# Patient Record
Sex: MALE | Employment: FULL TIME | URBAN - METROPOLITAN AREA
[De-identification: names, ages, dates, MRNs, and addresses within clinical notes are randomized per-mention and may not be internally consistent; named-entity substitution may affect disease eponyms.]

---

## 2021-05-27 ENCOUNTER — OFFICE VISIT (OUTPATIENT)
Dept: INTERNAL MEDICINE CLINIC | Facility: CLINIC | Age: 56
End: 2021-05-27
Payer: COMMERCIAL

## 2021-05-27 VITALS
WEIGHT: 179 LBS | BODY MASS INDEX: 28.77 KG/M2 | SYSTOLIC BLOOD PRESSURE: 122 MMHG | DIASTOLIC BLOOD PRESSURE: 84 MMHG | HEART RATE: 70 BPM | HEIGHT: 66 IN | RESPIRATION RATE: 16 BRPM | TEMPERATURE: 97 F

## 2021-05-27 DIAGNOSIS — Z13.220 SCREENING FOR LIPID DISORDERS: ICD-10-CM

## 2021-05-27 DIAGNOSIS — Z13.228 SCREENING FOR ENDOCRINE, NUTRITIONAL, METABOLIC AND IMMUNITY DISORDER: ICD-10-CM

## 2021-05-27 DIAGNOSIS — Z13.21 SCREENING FOR ENDOCRINE, NUTRITIONAL, METABOLIC AND IMMUNITY DISORDER: ICD-10-CM

## 2021-05-27 DIAGNOSIS — Z12.11 SCREEN FOR COLON CANCER: ICD-10-CM

## 2021-05-27 DIAGNOSIS — Z13.29 SCREENING FOR THYROID DISORDER: ICD-10-CM

## 2021-05-27 DIAGNOSIS — Z13.0 SCREENING FOR DISORDER OF BLOOD AND BLOOD-FORMING ORGANS: ICD-10-CM

## 2021-05-27 DIAGNOSIS — Z13.29 SCREENING FOR ENDOCRINE, NUTRITIONAL, METABOLIC AND IMMUNITY DISORDER: ICD-10-CM

## 2021-05-27 DIAGNOSIS — Z00.00 ROUTINE GENERAL MEDICAL EXAMINATION AT A HEALTH CARE FACILITY: Primary | ICD-10-CM

## 2021-05-27 DIAGNOSIS — Z13.0 SCREENING FOR ENDOCRINE, NUTRITIONAL, METABOLIC AND IMMUNITY DISORDER: ICD-10-CM

## 2021-05-27 DIAGNOSIS — Z12.5 ENCOUNTER FOR SPECIAL SCREENING EXAMINATION FOR NEOPLASM OF PROSTATE: ICD-10-CM

## 2021-05-27 PROCEDURE — 3074F SYST BP LT 130 MM HG: CPT | Performed by: INTERNAL MEDICINE

## 2021-05-27 PROCEDURE — 99386 PREV VISIT NEW AGE 40-64: CPT | Performed by: INTERNAL MEDICINE

## 2021-05-27 PROCEDURE — 3079F DIAST BP 80-89 MM HG: CPT | Performed by: INTERNAL MEDICINE

## 2021-05-27 PROCEDURE — 3008F BODY MASS INDEX DOCD: CPT | Performed by: INTERNAL MEDICINE

## 2021-05-28 NOTE — PROGRESS NOTES
Patient presents with:  Physical: DD Rm 3, NP Establish Care      HPI:  [place] speaking patient here for CPE.  He is , I see his wife  He work in a factory  Diet- fair  Exercise- none  H/o mild HL- improved with diet  No acute complaints  Due for cs Pfizer 30 mcg/0.3 ml                          05/21/2021       Physical Exam  /84 (BP Location: Right arm, Patient Position: Sitting, Cuff Size: large)   Pulse 70   Temp 97.3 °F (36.3 °C) (Temporal)   Resp 16   Ht 5' 6\" (1.676 m)   Wt 179 lb (81.2 k TSH W REFLEX TO FREE T4 [95555][Q]      Meds & Refills for this Visit:  Requested Prescriptions      No prescriptions requested or ordered in this encounter       Imaging & Consults:  GASTRO - INTERNAL      No follow-ups on file.     There are no Patient

## 2022-01-14 ENCOUNTER — TELEMEDICINE (OUTPATIENT)
Dept: INTERNAL MEDICINE CLINIC | Facility: CLINIC | Age: 57
End: 2022-01-14

## 2022-01-14 DIAGNOSIS — U07.1 COVID-19 VIRUS INFECTION: Primary | ICD-10-CM

## 2022-01-14 DIAGNOSIS — R05.9 COUGH: ICD-10-CM

## 2022-01-14 PROCEDURE — 99213 OFFICE O/P EST LOW 20 MIN: CPT | Performed by: FAMILY MEDICINE

## 2022-01-14 RX ORDER — BENZONATATE 100 MG/1
100 CAPSULE ORAL 3 TIMES DAILY PRN
Qty: 21 CAPSULE | Refills: 0 | Status: SHIPPED | OUTPATIENT
Start: 2022-01-14 | End: 2022-02-01

## 2022-01-14 NOTE — PROGRESS NOTES
Due to COVID-19 ACTION PLAN, the patient's office visit was converted to a video visit with informed patient consent.    Time Spent: 12 min    Subjective     HPI:   Angela Wade is a 62year old male who presents for evaluation of fever, myalgias, and feve decision-making and tests are ordered as detailed in the plan of care above.

## 2022-01-17 ENCOUNTER — TELEPHONE (OUTPATIENT)
Dept: INTERNAL MEDICINE CLINIC | Facility: CLINIC | Age: 57
End: 2022-01-17

## 2022-01-17 NOTE — TELEPHONE ENCOUNTER
Pt spouse calling on pt's behalf (on hippa). Pt is still having a temp that is 100 degrees or higher. Is now also experiencing pain in both ears, are we able to send something in for the ear pain? Please advise?

## 2022-01-17 NOTE — TELEPHONE ENCOUNTER
Telemed 1/14 with [facility]    Wife reports patient developed bilat ear pain and fever both on Saturday night. Temp yesterday 101.0, today 100.0. Taking tylenol and motrin. Denies dizziness or ear discharge. Wife asking for treatment. Please advise.

## 2022-01-17 NOTE — TELEPHONE ENCOUNTER
Discussed with patient's wife. Pt has waxing and waning ear fullness. Had fever over the weekend, but starting to improve. Discussed continued monitoring and supportive care with tylenol and motrin.  Symptoms likely secondary to his COVID infection that beg

## 2022-02-01 RX ORDER — BENZONATATE 100 MG/1
CAPSULE ORAL
Qty: 21 CAPSULE | Refills: 0 | Status: SHIPPED | OUTPATIENT
Start: 2022-02-01

## 2022-02-01 NOTE — TELEPHONE ENCOUNTER
Last OV 21  Last PE 21  Last REFILL   Medication Quantity Refills Start End   benzonatate 100 MG Oral Cap () 21 capsule 0 2022     Last LABS 21 tsh, psa, lipid, cmp, cbc    No future appointments. Per PROTOCOL?   Not on protocol     Please Advise

## 2023-07-17 ENCOUNTER — TELEPHONE (OUTPATIENT)
Dept: INTERNAL MEDICINE CLINIC | Facility: CLINIC | Age: 58
End: 2023-07-17

## 2023-07-17 DIAGNOSIS — Z13.29 SCREENING FOR THYROID DISORDER: ICD-10-CM

## 2023-07-17 DIAGNOSIS — Z13.220 SCREENING FOR LIPID DISORDERS: ICD-10-CM

## 2023-07-17 DIAGNOSIS — Z00.00 ROUTINE GENERAL MEDICAL EXAMINATION AT A HEALTH CARE FACILITY: Primary | ICD-10-CM

## 2023-07-17 DIAGNOSIS — Z12.5 SCREENING FOR MALIGNANT NEOPLASM OF PROSTATE: ICD-10-CM

## 2023-07-17 DIAGNOSIS — Z13.228 SCREENING FOR METABOLIC DISORDER: ICD-10-CM

## 2023-07-17 DIAGNOSIS — Z13.0 SCREENING FOR DISORDER OF BLOOD AND BLOOD-FORMING ORGANS: ICD-10-CM

## 2023-07-17 NOTE — TELEPHONE ENCOUNTER
Informed must fast no call back required. Orders to [facility].     Future Appointments   Date Time Provider Latoya Juana   7/27/2023  5:00 PM Mile Duvall MD [facility]

## 2023-07-23 LAB
ABSOLUTE BASOPHILS: 20 CELLS/UL (ref 0–200)
ABSOLUTE EOSINOPHILS: 52 CELLS/UL (ref 15–500)
ABSOLUTE LYMPHOCYTES: 3062 CELLS/UL (ref 850–3900)
ABSOLUTE MONOCYTES: 624 CELLS/UL (ref 200–950)
ABSOLUTE NEUTROPHILS: 2743 CELLS/UL (ref 1500–7800)
ALBUMIN/GLOBULIN RATIO: 1.3 (CALC) (ref 1–2.5)
ALBUMIN: 4.2 G/DL (ref 3.6–5.1)
ALKALINE PHOSPHATASE: 84 U/L (ref 35–144)
ALT: 18 U/L (ref 9–46)
AST: 17 U/L (ref 10–35)
BASOPHILS: 0.3 %
BILIRUBIN, TOTAL: 0.7 MG/DL (ref 0.2–1.2)
BUN: 17 MG/DL (ref 7–25)
CALCIUM: 9.6 MG/DL (ref 8.6–10.3)
CARBON DIOXIDE: 28 MMOL/L (ref 20–32)
CHLORIDE: 103 MMOL/L (ref 98–110)
CHOL/HDLC RATIO: 3.4 (CALC)
CHOLESTEROL, TOTAL: 240 MG/DL
CREATININE: 0.72 MG/DL (ref 0.7–1.3)
EGFR: 106 ML/MIN/1.73M2
EOSINOPHILS: 0.8 %
GLOBULIN: 3.2 G/DL (CALC) (ref 1.9–3.7)
GLUCOSE: 98 MG/DL (ref 65–99)
HDL CHOLESTEROL: 70 MG/DL
HEMATOCRIT: 45.7 % (ref 38.5–50)
HEMOGLOBIN: 15.1 G/DL (ref 13.2–17.1)
LDL-CHOLESTEROL: 145 MG/DL (CALC)
LYMPHOCYTES: 47.1 %
MCH: 32.5 PG (ref 27–33)
MCHC: 33 G/DL (ref 32–36)
MCV: 98.5 FL (ref 80–100)
MONOCYTES: 9.6 %
MPV: 10.2 FL (ref 7.5–12.5)
NEUTROPHILS: 42.2 %
NON-HDL CHOLESTEROL: 170 MG/DL (CALC)
PLATELET COUNT: 225 THOUSAND/UL (ref 140–400)
POTASSIUM: 4.3 MMOL/L (ref 3.5–5.3)
PROTEIN, TOTAL: 7.4 G/DL (ref 6.1–8.1)
PSA, TOTAL: 0.78 NG/ML
RDW: 12.5 % (ref 11–15)
RED BLOOD CELL COUNT: 4.64 MILLION/UL (ref 4.2–5.8)
SODIUM: 139 MMOL/L (ref 135–146)
TRIGLYCERIDES: 128 MG/DL
TSH W/REFLEX TO FT4: 3.91 MIU/L (ref 0.4–4.5)
WHITE BLOOD CELL COUNT: 6.5 THOUSAND/UL (ref 3.8–10.8)

## 2023-07-27 ENCOUNTER — OFFICE VISIT (OUTPATIENT)
Dept: INTERNAL MEDICINE CLINIC | Facility: CLINIC | Age: 58
End: 2023-07-27
Payer: COMMERCIAL

## 2023-07-27 VITALS
SYSTOLIC BLOOD PRESSURE: 114 MMHG | HEIGHT: 64.17 IN | BODY MASS INDEX: 31.1 KG/M2 | OXYGEN SATURATION: 96 % | TEMPERATURE: 98 F | DIASTOLIC BLOOD PRESSURE: 72 MMHG | HEART RATE: 76 BPM | WEIGHT: 182.19 LBS | RESPIRATION RATE: 16 BRPM

## 2023-07-27 DIAGNOSIS — E78.00 HIGH CHOLESTEROL: ICD-10-CM

## 2023-07-27 DIAGNOSIS — Z12.11 SCREEN FOR COLON CANCER: ICD-10-CM

## 2023-07-27 DIAGNOSIS — Z12.11 ENCOUNTER FOR SCREENING FECAL OCCULT BLOOD TESTING: ICD-10-CM

## 2023-07-27 DIAGNOSIS — Z00.00 ROUTINE GENERAL MEDICAL EXAMINATION AT A HEALTH CARE FACILITY: Primary | ICD-10-CM

## 2023-07-27 LAB
ATRIAL RATE: 73 BPM
P AXIS: 33 DEGREES
P-R INTERVAL: 130 MS
Q-T INTERVAL: 374 MS
QRS DURATION: 80 MS
QTC CALCULATION (BEZET): 412 MS
R AXIS: -25 DEGREES
T AXIS: 34 DEGREES
VENTRICULAR RATE: 73 BPM

## 2023-07-27 PROCEDURE — 99396 PREV VISIT EST AGE 40-64: CPT | Performed by: INTERNAL MEDICINE

## 2023-07-27 PROCEDURE — 93000 ELECTROCARDIOGRAM COMPLETE: CPT | Performed by: INTERNAL MEDICINE

## 2023-07-27 PROCEDURE — 3074F SYST BP LT 130 MM HG: CPT | Performed by: INTERNAL MEDICINE

## 2023-07-27 PROCEDURE — 3078F DIAST BP <80 MM HG: CPT | Performed by: INTERNAL MEDICINE

## 2023-07-27 PROCEDURE — 3008F BODY MASS INDEX DOCD: CPT | Performed by: INTERNAL MEDICINE

## 2024-07-19 ENCOUNTER — HOSPITAL ENCOUNTER (OUTPATIENT)
Dept: GENERAL RADIOLOGY | Age: 59
Discharge: HOME | End: 2024-07-19
Attending: PHYSICIAN ASSISTANT
Payer: COMMERCIAL

## 2024-07-19 ENCOUNTER — LAB ENCOUNTER (OUTPATIENT)
Dept: LAB | Age: 59
End: 2024-07-19
Attending: PHYSICIAN ASSISTANT
Payer: COMMERCIAL

## 2024-07-19 ENCOUNTER — OFFICE VISIT (OUTPATIENT)
Dept: INTERNAL MEDICINE CLINIC | Facility: CLINIC | Age: 59
End: 2024-07-19
Payer: COMMERCIAL

## 2024-07-19 VITALS
OXYGEN SATURATION: 96 % | SYSTOLIC BLOOD PRESSURE: 122 MMHG | BODY MASS INDEX: 31.01 KG/M2 | WEIGHT: 181.63 LBS | DIASTOLIC BLOOD PRESSURE: 80 MMHG | HEIGHT: 64.17 IN | RESPIRATION RATE: 18 BRPM | TEMPERATURE: 97 F | HEART RATE: 67 BPM

## 2024-07-19 DIAGNOSIS — M25.552 LEFT HIP PAIN: ICD-10-CM

## 2024-07-19 DIAGNOSIS — M72.2 PLANTAR FASCIITIS: ICD-10-CM

## 2024-07-19 DIAGNOSIS — E78.00 HIGH CHOLESTEROL: ICD-10-CM

## 2024-07-19 DIAGNOSIS — M25.552 LEFT HIP PAIN: Primary | ICD-10-CM

## 2024-07-19 DIAGNOSIS — R25.1 TREMOR: ICD-10-CM

## 2024-07-19 LAB
ALBUMIN SERPL-MCNC: 4.7 G/DL (ref 3.2–4.8)
ALBUMIN/GLOB SERPL: 1.5 {RATIO} (ref 1–2)
ALP LIVER SERPL-CCNC: 93 U/L
ALT SERPL-CCNC: 34 U/L
ANION GAP SERPL CALC-SCNC: 7 MMOL/L (ref 0–18)
AST SERPL-CCNC: 26 U/L (ref ?–34)
BASOPHILS # BLD AUTO: 0.03 X10(3) UL (ref 0–0.2)
BASOPHILS NFR BLD AUTO: 0.7 %
BILIRUB SERPL-MCNC: 0.8 MG/DL (ref 0.3–1.2)
BUN BLD-MCNC: 11 MG/DL (ref 9–23)
CALCIUM BLD-MCNC: 9.7 MG/DL (ref 8.7–10.4)
CHLORIDE SERPL-SCNC: 105 MMOL/L (ref 98–112)
CHOLEST SERPL-MCNC: 209 MG/DL (ref ?–200)
CO2 SERPL-SCNC: 27 MMOL/L (ref 21–32)
CREAT BLD-MCNC: 0.64 MG/DL
EGFRCR SERPLBLD CKD-EPI 2021: 109 ML/MIN/1.73M2 (ref 60–?)
EOSINOPHIL # BLD AUTO: 0.03 X10(3) UL (ref 0–0.7)
EOSINOPHIL NFR BLD AUTO: 0.7 %
ERYTHROCYTE [DISTWIDTH] IN BLOOD BY AUTOMATED COUNT: 12.6 %
FASTING PATIENT LIPID ANSWER: YES
FASTING STATUS PATIENT QL REPORTED: YES
GLOBULIN PLAS-MCNC: 3.2 G/DL (ref 2.8–4.4)
GLUCOSE BLD-MCNC: 97 MG/DL (ref 70–99)
HCT VFR BLD AUTO: 44 %
HDLC SERPL-MCNC: 63 MG/DL (ref 40–59)
HGB BLD-MCNC: 15.3 G/DL
IMM GRANULOCYTES # BLD AUTO: 0.01 X10(3) UL (ref 0–1)
IMM GRANULOCYTES NFR BLD: 0.2 %
LDLC SERPL CALC-MCNC: 131 MG/DL (ref ?–100)
LYMPHOCYTES # BLD AUTO: 1.85 X10(3) UL (ref 1–4)
LYMPHOCYTES NFR BLD AUTO: 41.7 %
MCH RBC QN AUTO: 33.1 PG (ref 26–34)
MCHC RBC AUTO-ENTMCNC: 34.8 G/DL (ref 31–37)
MCV RBC AUTO: 95.2 FL
MONOCYTES # BLD AUTO: 0.62 X10(3) UL (ref 0.1–1)
MONOCYTES NFR BLD AUTO: 14 %
NEUTROPHILS # BLD AUTO: 1.9 X10 (3) UL (ref 1.5–7.7)
NEUTROPHILS # BLD AUTO: 1.9 X10(3) UL (ref 1.5–7.7)
NEUTROPHILS NFR BLD AUTO: 42.7 %
NONHDLC SERPL-MCNC: 146 MG/DL (ref ?–130)
OSMOLALITY SERPL CALC.SUM OF ELEC: 287 MOSM/KG (ref 275–295)
PLATELET # BLD AUTO: 208 10(3)UL (ref 150–450)
POTASSIUM SERPL-SCNC: 4.4 MMOL/L (ref 3.5–5.1)
PROT SERPL-MCNC: 7.9 G/DL (ref 5.7–8.2)
RBC # BLD AUTO: 4.62 X10(6)UL
SODIUM SERPL-SCNC: 139 MMOL/L (ref 136–145)
TRIGL SERPL-MCNC: 85 MG/DL (ref 30–149)
TSI SER-ACNC: 2.69 MIU/ML (ref 0.55–4.78)
VLDLC SERPL CALC-MCNC: 15 MG/DL (ref 0–30)
WBC # BLD AUTO: 4.4 X10(3) UL (ref 4–11)

## 2024-07-19 PROCEDURE — 80061 LIPID PANEL: CPT | Performed by: PHYSICIAN ASSISTANT

## 2024-07-19 PROCEDURE — 73502 X-RAY EXAM HIP UNI 2-3 VIEWS: CPT | Performed by: PHYSICIAN ASSISTANT

## 2024-07-19 PROCEDURE — G2211 COMPLEX E/M VISIT ADD ON: HCPCS | Performed by: PHYSICIAN ASSISTANT

## 2024-07-19 PROCEDURE — 80053 COMPREHEN METABOLIC PANEL: CPT | Performed by: PHYSICIAN ASSISTANT

## 2024-07-19 PROCEDURE — 85025 COMPLETE CBC W/AUTO DIFF WBC: CPT | Performed by: PHYSICIAN ASSISTANT

## 2024-07-19 PROCEDURE — 36415 COLL VENOUS BLD VENIPUNCTURE: CPT | Performed by: PHYSICIAN ASSISTANT

## 2024-07-19 PROCEDURE — 99214 OFFICE O/P EST MOD 30 MIN: CPT | Performed by: PHYSICIAN ASSISTANT

## 2024-07-19 PROCEDURE — 84443 ASSAY THYROID STIM HORMONE: CPT | Performed by: PHYSICIAN ASSISTANT

## 2024-07-19 PROCEDURE — 82607 VITAMIN B-12: CPT | Performed by: PHYSICIAN ASSISTANT

## 2024-07-19 RX ORDER — MULTIVIT-MIN/IRON FUM/FOLIC AC 7.5 MG-4
1 TABLET ORAL DAILY
COMMUNITY

## 2024-07-19 NOTE — PROGRESS NOTES
Roddy Ca is a 59 year old male.   Chief Complaint   Patient presents with    Hand Pain     EJ Rm 13- Pt is here for left hand pain and trembling for over a month, the pain is increasing    Hip Pain     Here for left hip pain     HPI:    Pt presents with the following:      Tremor. Worst in left hand but also has in the right hand and left leg. Tremor has been ongoing x 2 years but wife thinks this is maybe worse now. Tremor is at rest and when reaching for items. Intermittent pain in left wrist. Denies numbness and tingling. Denies weakness. Denies family h/o tremor. He does not take any medications. He drinks 1 cup of coffee per day. No increase in caffeine to explain worsening tremor.     Also with left hip pain. Pt believes this first started 2 years ago as well but wife thinks this is more recent. He notes some left heel pain in the recent past and hip pain worsened after that. Heel is now better but still with hip pain. He feels the pain in his lateral hip with walking and with laying on left side. Denies back pain. Denies pain radiating into left arm. Denies numbness, tingling, and weakness in legs.     Allergies:  No Known Allergies   Current Meds:  Current Outpatient Medications   Medication Sig Dispense Refill    Multiple Vitamins-Minerals (MULTI-VITAMIN/MINERALS) Oral Tab Take 1 tablet by mouth daily.          PMH:   No past medical history on file.    ROS:   GENERAL: Negative for fever, chills and fatigue. NAD.  HENT: Negative for congestion, sore throat, and ear pain.  RESPIRATORY: Negative for cough, chest tightness, shortness of breath and wheezing.    CV: Negative for chest pain, palpitations and leg swelling.   GI: Negative for nausea, vomiting, abdominal pain, diarrhea, and blood in stool.   : Negative for dysuria, hematuria and difficulty urinating.   MUSCULOSKELETAL: +left hip pain, +left wrist pain   NEURO: Negative for dizziness, syncope, weakness, numbness, tingling and headaches.  +tremor.   PSYCH: The patient is not nervous/anxious. No depression.      PHYSICAL EXAM:    /80   Pulse 67   Temp 96.7 °F (35.9 °C) (Temporal)   Resp 18   Ht 5' 4.17\" (1.63 m)   Wt 181 lb 9.6 oz (82.4 kg)   SpO2 96%   BMI 31.01 kg/m²     GENERAL: NAD. A&Ox3  RESPIRATORY: CTAB, no R/R/W  CV: RRR, no murmurs.   ABD: Soft, non-tender, non-distended. +bowel sounds. No rebound tenderness.   NEURO: CN 2-12 grossly intact, strength 5/5 all ext, +resting tremor in both hands, tremors worsen with intention. No focal deficits.   UE: no tenderness on palpation, neg tinnels, neg phalens, strength 5/5 b/l  LE: left lateral hip tenderness, strength 5/5 b/l, full ROM b/l   PSYCH: Appropriate mood and affect.      ASSESSMENT/ PLAN:   1. Left hip pain  Check xray   Suspect bursitis/tendonitis related to compensation due to gait changes when heel was bothersome   Ibuprofen prn   May benefit from PT  - XR HIP W OR WO PELVIS 2 OR 3 VIEWS, LEFT (CPT=73502); Future    2. Tremor  Unclear etiology   Check labs   Will need neuro eval - referral placed today   - CBC W Differential W Platelet [E]  - TSH W Reflex To Free T4 [E]  - Comp Metabolic Panel (14) [E]  - Vitamin B12 [E]  - Neuro Referral - In Network    3. High cholesterol  Due for labs   - Lipid Panel [E]    4. Plantar fasciitis  Better now but stretching info given to help prevent/treat recurrence       Health Maintenance Due   Topic Date Due    Colorectal Cancer Screening  Never done    DTaP,Tdap,and Td Vaccines (1 - Tdap) Never done    Zoster Vaccines (1 of 2) Never done    COVID-19 Vaccine (4 - 2023-24 season) 09/01/2023    Annual Depression Screening  01/01/2024    Annual Physical  07/27/2024           Pt indicates understanding and agrees to the plan.     Return if symptoms worsen or fail to improve.    Kathryn Cifuentes PA-C

## 2024-07-20 LAB — VIT B12 SERPL-MCNC: 1730 PG/ML (ref 211–911)

## 2024-10-01 ENCOUNTER — OFFICE VISIT (OUTPATIENT)
Dept: NEUROLOGY | Facility: CLINIC | Age: 59
End: 2024-10-01
Payer: COMMERCIAL

## 2024-10-01 VITALS
HEIGHT: 66 IN | RESPIRATION RATE: 16 BRPM | BODY MASS INDEX: 29.41 KG/M2 | OXYGEN SATURATION: 98 % | HEART RATE: 80 BPM | DIASTOLIC BLOOD PRESSURE: 80 MMHG | WEIGHT: 183 LBS | SYSTOLIC BLOOD PRESSURE: 130 MMHG

## 2024-10-01 DIAGNOSIS — G20.C PARKINSONISM, UNSPECIFIED PARKINSONISM TYPE (HCC): Primary | ICD-10-CM

## 2024-10-01 PROCEDURE — 99204 OFFICE O/P NEW MOD 45 MIN: CPT | Performed by: OTHER

## 2024-10-01 RX ORDER — CARBIDOPA AND LEVODOPA 25; 100 MG/1; MG/1
TABLET ORAL
Qty: 30 TABLET | Refills: 2 | Status: SHIPPED | OUTPATIENT
Start: 2024-10-01

## 2024-10-01 NOTE — PROGRESS NOTES
Neurology History & Physical     ASSESSMENT & PLAN:      ICD-10-CM    1. Parkinsonism, unspecified Parkinsonism type (HCC)  G20.C         Parkinsonism, likely idiopathic PD, uncontrolled, start sinemet.  We discussed medication side effects.    Return in about 6 weeks (around 11/12/2024). With Dr. Landrum       ~~~~~~~~~~~~~~~~~~~~~~~~~~~    CHIEF COMPLAINT / REASON FOR VISIT:    Chief Complaint   Patient presents with    Tremors     Patient is here with his wife today  Patient stated his left hand/leg is having tremors      HISTORY OBTAINED FROM:  Patient and others as above  Chart review    HISTORY:  Roddy Ca is a 59 year old male with left hand tremor for about 1 year, and leg tremor starting about 3 months ago.  Has worsening imbalance in past year, but there is also heel pain.  No diarrhea or constipation.  No voice changes.  Wife doesn't believe his face has changed - he is a quiet, expressionless person to begin with.  Occ there is RUE tremor too.  He has always slept poorly, nothing new with that, no acting out of dreams or talking in sleep.  Doesn't take any medications, no recent dopamine agonist use.  Seems to have some trouble getting moving - lucina getting out of a chair, not necessarily during walking or turning.    DATA REVIEWED:  As documented in the history    July 2024  PA note  B12, CMP, CBC, TSH unremarkable    PHYSICAL EXAMINATION:  /80   Pulse 80   Resp 16   Ht 66\"   Wt 183 lb (83 kg)   SpO2 98%   BMI 29.54 kg/m²     Gen: in NAD  MSE: AAOx3, nl language, nl attn/conc, nl fund of knowledge  CN: PERRL, VFF; EOMI, no nystagmus; nl facial mvmt bilaterally; nl hearing bilaterally; nl palate elevation bilaterally; nl voice; nl shoulder shrug b/I; nl tongue movement  Motor: 5/5 x4; LUE resting tremor and cogwheeling, and slowed LUE finger tapping  Sensory: nl LT x4  Coord: nl FTN b/I  Reflex: 1+ BUE and BLE  Gait: normal turn, reduced Left arm swing with visible resting tremor in  COLLIN    No Known Allergies    Current medications:   carbidopa-levodopa  MG Oral Tab Half tablet with breakfast for 5 days, then half tablet with breakfast and with lunch 30 tablet 2    Multiple Vitamins-Minerals (MULTI-VITAMIN/MINERALS) Oral Tab Take 1 tablet by mouth daily.         History reviewed. No pertinent past medical history.    Past Surgical History:   Procedure Laterality Date    Hernia surgery  02/2019    left inguinal hernia- Dr. Nohemi Daniels    Other surgical history  2012    surgery on right ankle       Social History     Socioeconomic History    Marital status:    Tobacco Use    Smoking status: Former    Smokeless tobacco: Never    Tobacco comments:     social   Vaping Use    Vaping status: Never Used   Substance and Sexual Activity    Alcohol use: Yes     Comment: social CAGE 5/27/21    Drug use: No    Sexual activity: Yes   Other Topics Concern    Caffeine Concern Yes     Comment: Coffee    Exercise No       History reviewed. No pertinent family history.    Zachariah Carrington MD, FAES, FAAN  Board-Certified in Neurology, Epilepsy, and Clinical Neurophysiology  [facility]

## 2024-10-01 NOTE — PATIENT INSTRUCTIONS
Refill policies:    Allow 2-3 business days for refills; controlled substances may take longer.  Contact your pharmacy at least 5 days prior to running out of medication and have them send an electronic request or submit request through the “request refill” option in your [employer] account.  Refills are not addressed on weekends; covering physicians do not authorize routine medications on weekends.  No narcotics or controlled substances are refilled after noon on Fridays or by on call physicians.  By law, narcotics must be electronically prescribed.  A 30 day supply with no refills is the maximum allowed.  If your prescription is due for a refill, you may be due for a follow up appointment.  To best provide you care, patients receiving routine medications need to be seen at least once a year.  Patients receiving narcotic/controlled substance medications need to be seen at least once every 3 months.  In the event that your preferred pharmacy does not have the requested medication in stock (e.g. Backordered), it is your responsibility to find another pharmacy that has the requested medication available.  We will gladly send a new prescription to that pharmacy at your request.    Scheduling Tests:    If your physician has ordered radiology tests such as MRI or CT scans, please contact Central Scheduling at [phone] right away to schedule the test.  Once scheduled, the [facility] Centralized Referral Team will work with your insurance carrier to obtain pre-certification or prior authorization.  Depending on your insurance carrier, approval may take 3-10 days.  It is highly recommended patients assure they have received an authorization before having a test performed.  If test is done without insurance authorization, patient may be responsible for the entire amount billed.      Precertification and Prior Authorizations:  If your physician has recommended that you have a procedure or additional testing performed the [facility]  Centralized Referral Team will contact your insurance carrier to obtain pre-certification or prior authorization.    You are strongly encouraged to contact your insurance carrier to verify that your procedure/test has been approved and is a COVERED benefit.  Although the [facility] Centralized Referral Team does its due diligence, the insurance carrier gives the disclaimer that \"Although the procedure is authorized, this does not guarantee payment.\"    Ultimately the patient is responsible for payment.   Thank you for your understanding in this matter.  Paperwork Completion:  If you require FMLA or disability paperwork for your recovery, please make sure to either drop it off or have it faxed to our office at [phone]. Be sure the form has your name and date of birth on it.  The form will be faxed to our Forms Department and they will complete it for you.  There is a 25$ fee for all forms that need to be filled out.  Please be aware there is a 10-14 day turnaround time.  You will need to sign a release of information (LAURIE) form if your paperwork does not come with one.  You may call the Forms Department with any questions at [phone].  Their fax number is [phone].

## 2024-11-11 ENCOUNTER — TELEPHONE (OUTPATIENT)
Dept: NEUROLOGY | Facility: CLINIC | Age: 59
End: 2024-11-11

## 2024-11-11 NOTE — TELEPHONE ENCOUNTER
Patieny wife and patient has questions about carbidopa-levodopa  MG Oral Tab  dosage and would like a  call back

## 2024-11-11 NOTE — TELEPHONE ENCOUNTER
Patient calling with update on Sinemet.  Currently taking 1/2 tab with food at breakfast and lunch.   Initially Right hand tremor slight improvement, now he doesn't feel its helping enough.    Routed to provider

## 2024-11-11 NOTE — TELEPHONE ENCOUNTER
Spoke with Kerry (Pts; wife) and relayed Dr Carrignton's recommendations.      He may add a half tablet with dinner.  After 5 days, if this is not enough benefit, he can increase to full tab in morning, and then 5 days later increase to full tab in afternoon, and then 5 days later to full tab with dinner (so the goal is full tablet three times a daily with meals).       Kerry voiced understanding, [employer] message sent with instructions as well.

## 2024-12-09 ENCOUNTER — OFFICE VISIT (OUTPATIENT)
Dept: NEUROLOGY | Facility: CLINIC | Age: 59
End: 2024-12-09
Payer: COMMERCIAL

## 2024-12-09 VITALS
DIASTOLIC BLOOD PRESSURE: 74 MMHG | OXYGEN SATURATION: 97 % | BODY MASS INDEX: 30 KG/M2 | WEIGHT: 184 LBS | SYSTOLIC BLOOD PRESSURE: 120 MMHG | HEART RATE: 81 BPM

## 2024-12-09 DIAGNOSIS — G20.A1 PARKINSON'S DISEASE WITHOUT DYSKINESIA OR FLUCTUATING MANIFESTATIONS (HCC): Primary | ICD-10-CM

## 2024-12-09 PROCEDURE — 99215 OFFICE O/P EST HI 40 MIN: CPT | Performed by: OTHER

## 2024-12-09 RX ORDER — CARBIDOPA AND LEVODOPA 25; 100 MG/1; MG/1
2 TABLET ORAL 3 TIMES DAILY
Qty: 180 TABLET | Refills: 5 | Status: SHIPPED | OUTPATIENT
Start: 2024-12-09

## 2024-12-09 NOTE — PATIENT INSTRUCTIONS
1- Take carbidopa-levodopa () 1 tablet three times daily for 1 week then 1.5 tablets three times daily for 1 week then 2 tablets three times daily thereafter  2- Return in 4 months      Refill policies:    Allow 2-3 business days for refills; controlled substances may take longer.  Contact your pharmacy at least 5 days prior to running out of medication and have them send an electronic request or submit request through the “request refill” option in your [employer] account.  Refills are not addressed on weekends; covering physicians do not authorize routine medications on weekends.  No narcotics or controlled substances are refilled after noon on Fridays or by on call physicians.  By law, narcotics must be electronically prescribed.  A 30 day supply with no refills is the maximum allowed.  If your prescription is due for a refill, you may be due for a follow up appointment.  To best provide you care, patients receiving routine medications need to be seen at least once a year.  Patients receiving narcotic/controlled substance medications need to be seen at least once every 3 months.  In the event that your preferred pharmacy does not have the requested medication in stock (e.g. Backordered), it is your responsibility to find another pharmacy that has the requested medication available.  We will gladly send a new prescription to that pharmacy at your request.    Scheduling Tests:    If your physician has ordered radiology tests such as MRI or CT scans, please contact Central Scheduling at [phone] right away to schedule the test.  Once scheduled, the [facility] Centralized Referral Team will work with your insurance carrier to obtain pre-certification or prior authorization.  Depending on your insurance carrier, approval may take 3-10 days.  It is highly recommended patients assure they have received an authorization before having a test performed.  If test is done without insurance authorization, patient may be  responsible for the entire amount billed.      Precertification and Prior Authorizations:  If your physician has recommended that you have a procedure or additional testing performed the [facility] Centralized Referral Team will contact your insurance carrier to obtain pre-certification or prior authorization.    You are strongly encouraged to contact your insurance carrier to verify that your procedure/test has been approved and is a COVERED benefit.  Although the [facility] Centralized Referral Team does its due diligence, the insurance carrier gives the disclaimer that \"Although the procedure is authorized, this does not guarantee payment.\"    Ultimately the patient is responsible for payment.   Thank you for your understanding in this matter.  Paperwork Completion:  If you require FMLA or disability paperwork for your recovery, please make sure to either drop it off or have it faxed to our office at [phone]. Be sure the form has your name and date of birth on it.  The form will be faxed to our Forms Department and they will complete it for you.  There is a 25$ fee for all forms that need to be filled out.  Please be aware there is a 10-14 day turnaround time.  You will need to sign a release of information (LAURIE) form if your paperwork does not come with one.  You may call the Forms Department with any questions at [phone].  Their fax number is [phone].

## 2024-12-16 NOTE — PROGRESS NOTES
HPI:    Patient ID: Roddy Ca is a 59 year old male.    HPI    I am seeing Sam for the first time today, have not seen him previously.  He was referred to me by Dr. Carrington.   He was last seen in our clinic on October 1, 2024.  He started noticing a tremor in his left hand approximately 18 months ago that has progressed and involved his left leg.  He has no tremors in his jaw but occasionally notices a tremor in his right upper extremity..  He does notice problems with his fine motor skills and occasionally with his voice that becomes more hypophonic.   His gait has been unaffected, he shuffles occasionally, he does not fall.  There is no freezing of gait.   He complains of left hip pain. No rotator cuff injuries. Had been fiagnosed with plantar fasciitis.   He has been tried on carbidopa-levodopa (), he took half a tablet three times daily then decided to take full tablet in the morning and half tablet at noon. NO benefit on tremors  HISTORY:  History reviewed. No pertinent past medical history.   Past Surgical History:   Procedure Laterality Date    Hernia surgery  02/2019    left inguinal hernia- Dr. Nohemi Daniels    Other surgical history  2012    surgery on right ankle      History reviewed. No pertinent family history.   Social History     Socioeconomic History    Marital status:    Tobacco Use    Smoking status: Former    Smokeless tobacco: Never    Tobacco comments:     social   Vaping Use    Vaping status: Never Used   Substance and Sexual Activity    Alcohol use: Yes     Comment: social CAGE 5/27/21    Drug use: No    Sexual activity: Yes   Other Topics Concern    Caffeine Concern Yes     Comment: Coffee    Exercise No        Review of Systems  Negative except as in HPI       Current Outpatient Medications   Medication Sig Dispense Refill    carbidopa-levodopa  MG Oral Tab Take 2 tablets by mouth 3 (three) times daily. 180 tablet 5    Multiple Vitamins-Minerals (MULTI-VITAMIN/MINERALS)  Oral Tab Take 1 tablet by mouth daily.       Allergies:Allergies[1]  PHYSICAL EXAM:   Physical Exam    General Appearance: Well nourished, well developed, no apparent distress  Mental Status Exam: Patient is awake, alert and oriented     Cranial Nerves: funduscopic exam: difficult to visualize fundi/constricted pupils  II: Visual fields: normal to confrontation test  III: Pupils: equal, round, reactive to light, NO APD  III,IV,VI: Normal EOM. Saccades  V: Facial sensation: intact  VII: Facial strength: Normal, masked  VIII: Hearing: decreased  IX: Palate elevates symmetrically  XI: Shoulder shrug: slow  XII: Tongue protrudes in midline    Motor Exam: Normal tone. Strength is  5 out of 5 in proximal and distal muscles of upper and lower extremities  MDS-UPDRS Motor Exam  Patient currently on Levodopa: Yes half a tablet  Time of last dose: 2 hours    Speech: 1  Facial Expression: 1   Rigidity - Neck: 2  Rigidity - RUE: 1  Rigidity - LUE: 2  Rigidity - RLE: 1  Rigidity - LLE: 1  Finger tapping - R Hand: 2  Finger tapping - L Hand: 2  Hand movements - R Hand: 1  Hand movements - L Hand: 2  Pronation/supination - RH: 1  Pronation/supination - LH: 1  Toe tapping - R foot: 2  Toe tapping - L foot:1  Leg agility - R le  Leg agility - L le  Arising from chair: 1  Gait: 1  Freezing of gait: 0  Postural stability: 1  Posture: 0  Global Spontaneity of movement: 2  Postural tremor - RH: 0  Postural tremor - LH: 0  Kinetic tremor - RH: 0  Kinetic tremor - LH: 0  Rest tremor amplitude - RUE: 1  Rest tremor amplitude - LUE: 2  Rest tremor amplitude - RLE: 0  Rest tremor amplitude - LLE: 1  Rest tremor amplitude - Lip/jaw:0  Constancy of rest tremor: 3  Where dyskinesias present? No  Did these movements interfere with ratings? No  Denise and Yahr Stage: 2       Sensory: Normal sensation to superficial touch in all extremities  \    Coordination: Normal finger-nose-finger test    Gait: See MDS UPDRS    TESTS/IMAGING:          ASSESSMENT/PLAN:   Parkinson's Disease:  I told him he needs the clinical diagnostic arteria of Parkinson's disease as he was bradykinetic with mild rigidity and had rest tremor on his exam.  He has been undermedicated and I told him to increase the dose of carbidopa levodopa to a target dose of 2 tablets 3 times daily.  See the instructions on patient instructions below.  I counseled her about Parkinson's disease, etiology prognosis progression and potential therapies.  I explained the carbidopa levodopa is symptomatic therapy.  I told him I will see him back in 6 months or sooner if needed      Thank you for allowing us to participate in your patient's care.      Please do not hesitate to call if you have any questions.   I will continue to follow with you and will make further recommendations based on their progress.     60 total minutes spent with patient >50% of visit was spent in counseling and coordination of care      Meds This Visit:  Requested Prescriptions     Signed Prescriptions Disp Refills    carbidopa-levodopa  MG Oral Tab 180 tablet 5     Sig: Take 2 tablets by mouth 3 (three) times daily.       Imaging & Referrals:  None     ID#[id number]       [1] No Known Allergies

## 2025-05-12 ENCOUNTER — OFFICE VISIT (OUTPATIENT)
Dept: NEUROLOGY | Facility: CLINIC | Age: 60
End: 2025-05-12
Payer: COMMERCIAL

## 2025-05-12 VITALS
DIASTOLIC BLOOD PRESSURE: 68 MMHG | HEART RATE: 76 BPM | BODY MASS INDEX: 30 KG/M2 | WEIGHT: 184 LBS | SYSTOLIC BLOOD PRESSURE: 104 MMHG | RESPIRATION RATE: 18 BRPM

## 2025-05-12 DIAGNOSIS — G20.A1 PARKINSON'S DISEASE WITHOUT DYSKINESIA OR FLUCTUATING MANIFESTATIONS (HCC): Primary | ICD-10-CM

## 2025-05-12 PROCEDURE — 99214 OFFICE O/P EST MOD 30 MIN: CPT | Performed by: OTHER

## 2025-05-12 RX ORDER — CARBIDOPA/LEVODOPA 25MG-250MG
1 TABLET ORAL 3 TIMES DAILY
Qty: 180 TABLET | Refills: 3 | Status: SHIPPED | OUTPATIENT
Start: 2025-05-12

## 2025-05-12 NOTE — PATIENT INSTRUCTIONS
1- Stop carbidopa-levodopa   2- Take carbidopa-levodopa  1 tablet three times daily      Refill policies:     Contact your pharmacy at least 5 days prior to running out of medication and have them send an electronic request or submit request through the “request refill” option in your [employer] account.  Allow 3-5 business days for refills; controlled substances may take longer.  If your prescription is due for a refill, please make sure you have a follow up appointment scheduled with the appropriate prescribing physician.  To best provide you care, patients receiving routine medications need to be seen at least once a year.  Patients receiving narcotic/controlled substance medications need to be seen at least once every 3 months.  Patients receiving narcotic/controlled substance medications will be required to sign an Opioid Treatment Agreement/Contract.  Refills will not be refilled on weekends or holidays; on-call physicians will not refill routine medications.  No narcotics or controlled substances are refilled after noon on Fridays or by on-call physicians.  Federal Law states narcotics must be electronically prescribed.  A 30-day supply with no refills is the maximum allowed by law.  In the event that your preferred pharmacy does not have the requested medication in stock (e.g., Backordered), it is your responsibility to find another pharmacy that has the requested medication available.  We will gladly send a new prescription to that pharmacy at your request.

## 2025-05-13 NOTE — PROGRESS NOTES
HPI:    Patient ID: Roddy Ca is a 60 year old male.    HPI    I last saw him on 2024, diagnosed him disease, he returns today accompanied by his wife who helps with the history as well as with interpretation.   Since I started him on carbidopa levodopa and increase the dose up to 2 tablets 3 times daily he believes the tremor on his left extremities improved significantly, his gait has also improved.  He denies any falls, denies any speech or swallowing problems.  No early wearing off, no other motor fluctuations, no dyskinesias.   He sleeps okay overall, told me that he wakes up occasionally 3 or 4 hours after he falls asleep and it is hard for him to go back to bed.  Memory has been the same, no hallucinations  HISTORY:  Past Medical History[1]   Past Surgical History[2]   Family History[3]   Short Social Hx on File[4]     Review of Systems    Negative except as in HPI   Current Medications[5]  Allergies:Allergies[6]  PHYSICAL EXAM:   Physical Exam    General Appearance: Well nourished, well developed, no apparent distress.     HEENT: Normocephalic and atraumatic. Normal sclera.    Visual fields full to confrontation test, extraocular movements intact, mild masked facies, speech mildly hypophonic but easy to understand  MDS-UPDRS Motor Exam  Patient currently on Levodopa: Yes half a tablet  Time of last dose: 2 hours     Speech: 1  Facial Expression: 1   Rigidity - Neck: 1  Rigidity - RUE: 1  Rigidity - LUE: 3  Rigidity - RLE: 1  Rigidity - LLE: 1  Finger tapping - R Hand: 2  Finger tapping - L Hand: 2  Hand movements - R Hand: 1  Hand movements - L Hand: 2  Pronation/supination - RH: 1  Pronation/supination - LH: 1  Toe tapping - R foot: 2  Toe tapping - L foot:1  Leg agility - R le  Leg agility - L le  Arising from chair: 1  Gait: 1  Freezing of gait: 0  Postural stability: 1  Posture: 0  Global Spontaneity of movement: 2  Postural tremor - RH: 0  Postural tremor - LH: 0  Kinetic tremor - RH:  0  Kinetic tremor - LH: 0  Rest tremor amplitude - RUE: 0  Rest tremor amplitude - LUE: 1  Rest tremor amplitude - RLE: 0  Rest tremor amplitude - LLE: 1  Rest tremor amplitude - Lip/jaw:0  Constancy of rest tremor: 2  Where dyskinesias present? No  Did these movements interfere with ratings? No  Denise and [place]ahr Stage: 2    Total: 35    TESTS/IMAGING:         ASSESSMENT/PLAN:   Parkinson's disease:  Tremors better controlled on his left extremities with carbidopa levodopa , 2 tablets 3 times daily.  He reports no motor fluctuations, remains rigid in his left upper extremity and moderately bradykinetic on the left.  I told him to stop taking the immediate release  and to start taking the immediate release  1 tablet 3 times daily, I plan eventually to increase the dose to 1 tablet 4 times daily.  I will see him in 4 months or sooner if needed      Thank you for allowing us to participate in your patient's care.      Meds This Visit:  Requested Prescriptions     Signed Prescriptions Disp Refills    carbidopa-levodopa  MG Oral Tab 180 tablet 3     Sig: Take 1 tablet by mouth 3 (three) times daily.       Imaging & Referrals:  None     ID#[id number]       [1] History reviewed. No pertinent past medical history.  [2]   Past Surgical History:  Procedure Laterality Date    Hernia surgery  02/2019    left inguinal hernia- Dr. Nohemi Daniels    Other surgical history  2012    surgery on right ankle   [3] History reviewed. No pertinent family history.  [4]   Social History  Socioeconomic History    Marital status:    Tobacco Use    Smoking status: Former    Smokeless tobacco: Never    Tobacco comments:     social   Vaping Use    Vaping status: Never Used   Substance and Sexual Activity    Alcohol use: Yes     Comment: social CAGE 5/27/21    Drug use: No    Sexual activity: Yes   Other Topics Concern    Caffeine Concern Yes     Comment: Coffee    Exercise No   [5]   Current Outpatient Medications    Medication Sig Dispense Refill    carbidopa-levodopa  MG Oral Tab Take 1 tablet by mouth 3 (three) times daily. 180 tablet 3    Multiple Vitamins-Minerals (MULTI-VITAMIN/MINERALS) Oral Tab Take 1 tablet by mouth daily.     [6] No Known Allergies

## (undated) DIAGNOSIS — R05.9 COUGH: ICD-10-CM

## (undated) NOTE — LETTER
Date: 1/14/2022    Patient Name: Jaclyn Rolon          To Whom it may concern: This letter has been written at the patient's request. The above patient was seen at the [facility] for treatment of a medical condition.     This patient is re